# Patient Record
Sex: MALE | Race: WHITE
[De-identification: names, ages, dates, MRNs, and addresses within clinical notes are randomized per-mention and may not be internally consistent; named-entity substitution may affect disease eponyms.]

---

## 2017-10-24 ENCOUNTER — HOSPITAL ENCOUNTER (EMERGENCY)
Dept: HOSPITAL 45 - C.EDB | Age: 22
Discharge: HOME | End: 2017-10-24
Payer: COMMERCIAL

## 2017-10-24 VITALS
BODY MASS INDEX: 16.37 KG/M2 | WEIGHT: 108.03 LBS | BODY MASS INDEX: 16.37 KG/M2 | HEIGHT: 67.99 IN | HEIGHT: 67.99 IN | WEIGHT: 108.03 LBS

## 2017-10-24 VITALS
HEART RATE: 96 BPM | TEMPERATURE: 98.42 F | OXYGEN SATURATION: 97 % | SYSTOLIC BLOOD PRESSURE: 122 MMHG | DIASTOLIC BLOOD PRESSURE: 70 MMHG

## 2017-10-24 DIAGNOSIS — K29.00: Primary | ICD-10-CM

## 2017-10-24 LAB
ALP SERPL-CCNC: 72 U/L (ref 45–117)
ALT SERPL-CCNC: 18 U/L (ref 12–78)
ANION GAP SERPL CALC-SCNC: 9 MMOL/L (ref 3–11)
AST SERPL-CCNC: 18 U/L (ref 15–37)
BASOPHILS # BLD: 0.01 K/UL (ref 0–0.2)
BASOPHILS NFR BLD: 0.2 %
BUN SERPL-MCNC: 10 MG/DL (ref 7–18)
BUN/CREAT SERPL: 10 (ref 10–20)
CALCIUM SERPL-MCNC: 9.2 MG/DL (ref 8.5–10.1)
CHLORIDE SERPL-SCNC: 104 MMOL/L (ref 98–107)
CO2 SERPL-SCNC: 27 MMOL/L (ref 21–32)
COMPLETE: YES
CREAT CL PREDICTED SERPL C-G-VRATE: 84.4 ML/MIN
CREAT SERPL-MCNC: 0.96 MG/DL (ref 0.6–1.4)
EOSINOPHIL NFR BLD AUTO: 251 K/UL (ref 130–400)
GLUCOSE SERPL-MCNC: 89 MG/DL (ref 70–99)
HCT VFR BLD CALC: 43 % (ref 42–52)
IG%: 0.2 %
IMM GRANULOCYTES NFR BLD AUTO: 22.8 %
LYMPHOCYTES # BLD: 1.26 K/UL (ref 1.2–3.4)
MCH RBC QN AUTO: 31.8 PG (ref 25–34)
MCHC RBC AUTO-ENTMCNC: 34.7 G/DL (ref 32–36)
MCV RBC AUTO: 91.7 FL (ref 80–100)
MONOCYTES NFR BLD: 5.3 %
NEUTROPHILS # BLD AUTO: 2.5 %
NEUTROPHILS NFR BLD AUTO: 69 %
PMV BLD AUTO: 10.9 FL (ref 7.4–10.4)
POTASSIUM SERPL-SCNC: 3.5 MMOL/L (ref 3.5–5.1)
RBC # BLD AUTO: 4.69 M/UL (ref 4.7–6.1)
SODIUM SERPL-SCNC: 140 MMOL/L (ref 136–145)
WBC # BLD AUTO: 5.52 K/UL (ref 4.8–10.8)

## 2017-10-24 NOTE — EMERGENCY ROOM VISIT NOTE
History


Report prepared by Agata:  Ki Harden


Under the Supervision of:  Dr. Ben Pink D.O.


First contact with patient:  15:28


Chief Complaint:  ABDOMINAL PAIN


Stated Complaint:  ABD. PAINS


Nursing Triage Summary:  


mid gastric abd pain and HA with bloating for several days





History of Present Illness


The patient is a 22 year old male who presents to the Emergency Room with 

complaints of intermittent lower abdominal pain beginning two weeks ago. He 

states that he has had abdominal pain each time after has taken Excedrin for 

headaches. He also complains of abdominal bloating. The patient states that he 

has frequent headaches and often takes Excedrin. He states that this initial 

pain was "sharp", but now feels more "dull". He denies any urinary symptoms, 

cough, runny nose, or black or bloody stool.  The patient drinks alcohol, but 

has not had alcohol in about a month.  Patient denies any chest pain or 

shortness breath.  No weakness or dizziness.





   Source of History:  patient


   Onset:  Two weeks ago


   Position:  abdomen (lower)


   Timing:  intermittent


   Modifying Factors (Worsening):  other (Excedrin)


   Associated Symptoms:  + headache, No cough, No melena, No hematochezia, No 

urinary symptoms





Review of Systems


See HPI for pertinent positives & negatives. A total of 10 systems reviewed and 

were otherwise negative.





Past Medical & Surgical


Medical Problems:


(1) No Known Active Medical Problems








Family History


No pertinent family history stated.





Social History


Smoking Status:  Never Smoker


Occupation Status:  employed, [employer] student





Current/Historical Medications


Scheduled


Famotidine (Pepcid), 20 MG PO DAILY





Scheduled PRN


Aspirin-Acetaminophen-Caffeine (Excedrin Extra Strength), 1 TAB PO UD PRN for 

Pain


Calcium Carbonate (Tums), 500 MG PO UD PRN for Indigestion





Allergies


Coded Allergies:  


     No Known Allergies (Unverified , 10/24/17)





Physical Exam


Vital Signs











  Date Time  Temp Pulse Resp B/P (MAP) Pulse Ox O2 Delivery O2 Flow Rate FiO2


 


10/24/17 15:27 36.9 96 16 122/70 97   











Physical Exam


GENERAL: Sitting up in bed, alert, well appearing, well nourished, no distress, 

non-toxic 


EYE EXAM: normal conjunctiva.


OROPHARYNX: no exudate, no erythema, lips, buccal mucosa, and tongue normal and 

mucous membranes are moist


NECK: supple, no nuchal rigidity, no adenopathy, non-tender


LUNGS: Clear to auscultation. Normal chest wall mechanics


HEART: no murmurs, S1 normal and S2 normal 


ABDOMEN: abdomen soft, non-tender, normo-active bowel sounds, no masses, no 

rebound or guarding. 


BACK: Back is symmetrical on inspection and there is no deformity, no midline 

tenderness, no CVA tenderness. 


SKIN: no rashes and no bruising 


UPPER EXTREMITIES: upper extremities are grossly normal. 


LOWER EXTREMITIES: No pitting edema.


NEURO EXAM: Normal sensorium, cranial nerves II-XII grossly intact, normal 

speech,  no gross weakness of arms, no gross weakness of legs.





Medical Decision & Procedures


ER Provider


Diagnostic Interpretation:


Radiology results as stated below per my review and the radiologist's 

interpretation: 





ULTRASOUND RIGHT UPPER QUADRANT ABDOMEN





FINDINGS:





Liver: The liver is normal in size and echotexture. There is no intrahepatic


biliary ductal dilatation. The main portal vein is patent.





Gallbladder: The gallbladder is normal in appearance. No gallstones are


identified. There is no gallbladder wall thickening or pericholecystic fluid. A


sonographic Martínez's sign is reportedly absent. The common bile duct measures up


to 0.3 cm in diameter.





Pancreas: Visualized portions of the pancreatic head and body are normal in


appearance. The splenic vein is patent.





Right kidney: Survey images of the right kidney demonstrate normal size and


echotexture. There is no hydronephrosis.





Ascites: None.





IMPRESSION: Unremarkable sonographic assessment of the right upper quadrant. No


gallstones are identified.





Electronically signed by:  Shlomo Cotton M.D.


10/24/2017 4:49 PM








CHEST AND ABDOMEN 2 VIEWS





FINDINGS: The lungs are clear. The cardiomediastinal silhouette is within normal


limits.


There is no pneumoperitoneum or pneumatosis. The bowel gas pattern is


unremarkable. No evidence for bowel obstruction. No renal or ureteral calculi.


Punctate calcification within the left deep pelvis likely represents a


phlebolith. Incidental note is made of a right azygos lobe..





IMPRESSION:  


No acute cardiopulmonary process. No evidence for bowel obstruction. 





Electronically signed by:  Eddie Quinonez M.D.


10/24/2017 4:25 PM





Laboratory Results


10/24/17 15:55








Red Blood Count 4.69, Mean Corpuscular Volume 91.7, Mean Corpuscular Hemoglobin 

31.8, Mean Corpuscular Hemoglobin Concent 34.7, Mean Platelet Volume 10.9, 

Neutrophils (%) (Auto) 69.0, Lymphocytes (%) (Auto) 22.8, Monocytes (%) (Auto) 

5.3, Eosinophils (%) (Auto) 2.5, Basophils (%) (Auto) 0.2, Neutrophils # (Auto) 

3.81, Lymphocytes # (Auto) 1.26, Monocytes # (Auto) 0.29, Eosinophils # (Auto) 

0.14, Basophils # (Auto) 0.01





10/24/17 15:55

















Test


  10/24/17


15:55


 


White Blood Count


  5.52 K/uL


(4.8-10.8)


 


Red Blood Count


  4.69 M/uL


(4.7-6.1)


 


Hemoglobin


  14.9 g/dL


(14.0-18.0)


 


Hematocrit 43.0 % (42-52) 


 


Mean Corpuscular Volume


  91.7 fL


()


 


Mean Corpuscular Hemoglobin


  31.8 pg


(25-34)


 


Mean Corpuscular Hemoglobin


Concent 34.7 g/dl


(32-36)


 


Platelet Count


  251 K/uL


(130-400)


 


Mean Platelet Volume


  10.9 fL


(7.4-10.4)


 


Neutrophils (%) (Auto) 69.0 % 


 


Lymphocytes (%) (Auto) 22.8 % 


 


Monocytes (%) (Auto) 5.3 % 


 


Eosinophils (%) (Auto) 2.5 % 


 


Basophils (%) (Auto) 0.2 % 


 


Neutrophils # (Auto)


  3.81 K/uL


(1.4-6.5)


 


Lymphocytes # (Auto)


  1.26 K/uL


(1.2-3.4)


 


Monocytes # (Auto)


  0.29 K/uL


(0.11-0.59)


 


Eosinophils # (Auto)


  0.14 K/uL


(0-0.5)


 


Basophils # (Auto)


  0.01 K/uL


(0-0.2)


 


RDW Standard Deviation


  42.3 fL


(36.4-46.3)


 


RDW Coefficient of Variation


  12.7 %


(11.5-14.5)


 


Immature Granulocyte % (Auto) 0.2 % 


 


Immature Granulocyte # (Auto)


  0.01 K/uL


(0.00-0.02)


 


Anion Gap


  9.0 mmol/L


(3-11)


 


Est Creatinine Clear Calc


Drug Dose 84.4 ml/min 


 


 


Estimated GFR (


American) 130.4 


 


 


Estimated GFR (Non-


American 112.5 


 


 


BUN/Creatinine Ratio 10.0 (10-20) 


 


Calcium Level


  9.2 mg/dl


(8.5-10.1)


 


Total Bilirubin


  0.6 mg/dl


(0.2-1)


 


Direct Bilirubin


  0.2 mg/dl


(0-0.2)


 


Aspartate Amino Transf


(AST/SGOT) 18 U/L (15-37) 


 


 


Alanine Aminotransferase


(ALT/SGPT) 18 U/L (12-78) 


 


 


Alkaline Phosphatase


  72 U/L


()


 


Total Protein


  8.3 gm/dl


(6.4-8.2)


 


Albumin


  4.7 gm/dl


(3.4-5.0)


 


Lipase


  86 U/L


()





Laboratory results per my review.





Medications Administered











 Medications


  (Trade)  Dose


 Ordered  Sig/Gregorio


 Route  Start Time


 Stop Time Status Last Admin


Dose Admin


 


 Miscellaneous


 Medication


  (Gi Cocktail)  24 ml  NOW  STAT


 PO  10/24/17 15:41


 10/24/17 15:51 DC 10/24/17 15:57


24 ML











ED Course


ED COURSE: 


Vital signs were reviewed and appeared normal. 


The patients medical record was reviewed


The above diagnostic studies were performed and reviewed.


ED treatments and interventions as stated above. 





1530: The patient was evaluated in room B6. A complete history and physical 

examination was performed.





1541: Ordered GI Cocktail 24 mL PO. 





1655: Upon reevaluation, the patient is resting comfortably. I discussed my 

findings with the patient and he understands and agrees with the treatment 

plan.   


Based on the patients age, coexisting illnesses, exam and lab findings the 

decision to treat as an outpatient was made.


The patient remained stable while under my care.


The patient appeared well at the time of discharge.





Medical Decision


Differential diagnoses includes but is not limited to gastritis, peptic ulcer 

disease, GERD, gallbladder disease, pancreatitis, small bowel obstruction, 

acute coronary syndrome, pericarditis, ischemic bowel, irritable bowel disease, 

irritable bowel syndrome, appendicitis, diverticulitis, malignancy, hernia, 

urinary tract infection, torsion, perforation, trauma, infectious. 





Patient is a 22-year-old male who presents to ER for abdominal pain following 

taking Motrin or Excedrin.  He notes that this has been intermittently present 

for the past 2 weeks.  No dark tarry stools.  No blood in stool.  CBC all BMP, 

LFTs, bilirubin lipase was unremarkable.  Patient was given a GI cocktail.  

Obstruction series and ultrasound the right upper quadrant were unremarkable.  

He felt significant better following the GI cocktail.  He was updated at 

bedside.  He was discharged as I favor he likely has gastritis.  Showed him to 

refrain from NSAIDs, caffeine and alcohol.  Will monitor for any worsening pain

, blood in stool or dark tarry stools.  Follow-up with PCP. Discussed with Pt 

concerning signs and symptoms to watch out for. Pt was instructed to follow up 

with their PCP and discussed with the patient their option to return to the ED 

at anytime for persistent or worsening symptoms. The appropriate anticipatory 

guidance and out-patient management, including indications for return to the 

emergency department, were explained at length to the patient and understood.





Medication Reconcilliation


Current Medication List:  was personally reviewed by me





Blood Pressure Screening


Patient's blood pressure:  Normal blood pressure


Blood pressure disposition:  Did not require urgent referral





Impression





 Primary Impression:  


 Gastritis





Scribe Attestation


The scribe's documentation has been prepared under my direction and personally 

reviewed by me in its entirety. I confirm that the note above accurately 

reflects all work, treatment, procedures, and medical decision making performed 

by me.





Departure Information


Dispostion


Home / Self-Care





Prescriptions





Famotidine (Pepcid) 20 Mg Tab


20 MG PO DAILY, #30 TAB


   Prov: Ben Pink, DO         10/24/17





Referrals


No Doctor, Assigned (PCP)





Forms


HOME CARE DOCUMENTATION FORM,                                                 

               IMPORTANT VISIT INFORMATION





Patient Instructions


ED PUD Vs Gastritis, My [facility]





Additional Instructions





Please follow up with your primary care doctor or if you are a student, 

[facility] with in the next 24 hours.  Any worsening of your 

symptoms, please return to the ED immediately. This includes any fevers greater 

than 100.4, worsening pain, chest pain, shortness breath, persistent nausea, 

vomiting, unable to eat or drink, or any other concerning signs or symptoms 

from your standpoint.





Please refrain from taking any NSAIDs which Includes Motrin or Ibuprofen.





Please take Pepcid as prescribed.





Problem Qualifiers








 Primary Impression:  


 Gastritis


 Gastritis type:  unspecified gastritis  Chronicity:  acute  Gastritis bleeding

:  without bleeding  Qualified Codes:  K29.00 - Acute gastritis without bleeding

## 2017-10-24 NOTE — DIAGNOSTIC IMAGING REPORT
ULTRASOUND RIGHT UPPER QUADRANT ABDOMEN



CLINICAL HISTORY: Right upper quadrant abdominal pain.



COMPARISON STUDY: Abdominal radiograph dated 10/24/2017.



TECHNIQUE: Real-time, grayscale, and color flow sonography of the right upper

quadrant of the abdomen was performed. Images are reviewed in the transverse and

longitudinal planes.



FINDINGS:



Liver: The liver is normal in size and echotexture. There is no intrahepatic

biliary ductal dilatation. The main portal vein is patent.



Gallbladder: The gallbladder is normal in appearance. No gallstones are

identified. There is no gallbladder wall thickening or pericholecystic fluid. A

sonographic Martínez's sign is reportedly absent. The common bile duct measures up

to 0.3 cm in diameter.



Pancreas: Visualized portions of the pancreatic head and body are normal in

appearance. The splenic vein is patent.



Right kidney: Survey images of the right kidney demonstrate normal size and

echotexture. There is no hydronephrosis.



Ascites: None.



IMPRESSION: Unremarkable sonographic assessment of the right upper quadrant. No

gallstones are identified.







Electronically signed by:  Shlomo Cotton M.D.

10/24/2017 4:49 PM



Dictated Date/Time:  10/24/2017 4:48 PM

## 2017-10-24 NOTE — DIAGNOSTIC IMAGING REPORT
CHEST AND ABDOMEN 2 VIEWS



HISTORY: Generalized abdominal pain.   



COMPARISON:  None.



FINDINGS: The lungs are clear. The cardiomediastinal silhouette is within normal

limits.

There is no pneumoperitoneum or pneumatosis. The bowel gas pattern is

unremarkable. No evidence for bowel obstruction. No renal or ureteral calculi.

Punctate calcification within the left deep pelvis likely represents a

phlebolith. Incidental note is made of a right azygos lobe..



IMPRESSION:  

No acute cardiopulmonary process. No evidence for bowel obstruction. 









Electronically signed by:  Eddie Quinonez M.D.

10/24/2017 4:25 PM



Dictated Date/Time:  10/24/2017 4:23 PM

## 2018-04-23 ENCOUNTER — HOSPITAL ENCOUNTER (EMERGENCY)
Dept: HOSPITAL 45 - C.EDB | Age: 23
Discharge: HOME | End: 2018-04-23
Payer: COMMERCIAL

## 2018-04-23 VITALS
HEIGHT: 67.01 IN | BODY MASS INDEX: 16.78 KG/M2 | WEIGHT: 106.92 LBS | BODY MASS INDEX: 16.78 KG/M2 | WEIGHT: 106.92 LBS | HEIGHT: 67.01 IN

## 2018-04-23 VITALS — DIASTOLIC BLOOD PRESSURE: 61 MMHG | OXYGEN SATURATION: 98 % | SYSTOLIC BLOOD PRESSURE: 103 MMHG | HEART RATE: 75 BPM

## 2018-04-23 VITALS — TEMPERATURE: 97.7 F

## 2018-04-23 DIAGNOSIS — R10.10: Primary | ICD-10-CM

## 2018-04-23 LAB
ALBUMIN SERPL-MCNC: 4.4 GM/DL (ref 3.4–5)
ALP SERPL-CCNC: 67 U/L (ref 45–117)
ALT SERPL-CCNC: 18 U/L (ref 12–78)
AST SERPL-CCNC: 16 U/L (ref 15–37)
BASOPHILS # BLD: 0.01 K/UL (ref 0–0.2)
BASOPHILS NFR BLD: 0.2 %
BUN SERPL-MCNC: 10 MG/DL (ref 7–18)
CALCIUM SERPL-MCNC: 9.2 MG/DL (ref 8.5–10.1)
CO2 SERPL-SCNC: 28 MMOL/L (ref 21–32)
CREAT SERPL-MCNC: 0.92 MG/DL (ref 0.6–1.4)
EOS ABS #: 0.51 K/UL (ref 0–0.5)
EOSINOPHIL NFR BLD AUTO: 222 K/UL (ref 130–400)
GLUCOSE SERPL-MCNC: 92 MG/DL (ref 70–99)
HCT VFR BLD CALC: 42.5 % (ref 42–52)
HGB BLD-MCNC: 15 G/DL (ref 14–18)
IG#: 0 K/UL (ref 0–0.02)
IMM GRANULOCYTES NFR BLD AUTO: 22.5 %
LIPASE: 88 U/L (ref 73–393)
LYMPHOCYTES # BLD: 1.01 K/UL (ref 1.2–3.4)
MCH RBC QN AUTO: 32.4 PG (ref 25–34)
MCHC RBC AUTO-ENTMCNC: 35.3 G/DL (ref 32–36)
MCV RBC AUTO: 91.8 FL (ref 80–100)
MONO ABS #: 0.22 K/UL (ref 0.11–0.59)
MONOCYTES NFR BLD: 4.9 %
NEUT ABS #: 2.73 K/UL (ref 1.4–6.5)
NEUTROPHILS # BLD AUTO: 11.4 %
NEUTROPHILS NFR BLD AUTO: 61 %
PMV BLD AUTO: 10.6 FL (ref 7.4–10.4)
POTASSIUM SERPL-SCNC: 3.7 MMOL/L (ref 3.5–5.1)
PROT SERPL-MCNC: 7.8 GM/DL (ref 6.4–8.2)
RED CELL DISTRIBUTION WIDTH CV: 12.5 % (ref 11.5–14.5)
RED CELL DISTRIBUTION WIDTH SD: 42.2 FL (ref 36.4–46.3)
SODIUM SERPL-SCNC: 137 MMOL/L (ref 136–145)
WBC # BLD AUTO: 4.48 K/UL (ref 4.8–10.8)

## 2018-04-23 NOTE — EMERGENCY ROOM VISIT NOTE
History


Report prepared by Agata:  Linus Pantoja


Under the Supervision of:  Dr. Skinny Coates M.D.


First contact with patient:  14:56


Chief Complaint:  ABDOMINAL PAIN


Stated Complaint:  ABDOMINAL DISCOMFORT





History of Present Illness


The patient is a 22 year old male who presents to the Emergency Room with 

complaints of on and off abdominal pain since October, and he states that he 

gets the pain every other day usually. He currently rates his discomfort as a 1-

2/10 in severity and states that it feels like a burning with an occasional 

pinch. The patient states that the pain is "behind his belly button on the left 

to behind his liver". He reports that it initially started after mixing 

Excedrin and ibuprofen, and he was diagnosed with gastritis. The patient was 

prescribed Pepcid afterwards which helped, though he ran out of medications, 

and the pain came back. He notes that the pain is worsened after drinking 

alcohol, fatty foods, and spicy foods. He states that he has not drank alcohol 

in the past 2 months. The patient states that he had diarrhea yesterday morning 

after drinking milk, and today he had a normal bowel movement.  He is not sure 

if he is lactose intolerant but has been noticing some crampy pain and diarrhea 

after drinking milk.  He denies any family history of Crohn's disease or IBS. 

The patient denies any fever, black stools, bloody stools, difficulty urinating

, hematuria, and vomiting. He has no other chronic medical problems.





   Source of History:  patient


   Onset:  October


   Position:  abdomen (behind his belly button on the left to behind his liver)


   Quality:  burning, other (pinching)


   Timing:  other (on and off)


   Modifying Factors (Worsening):  other (drinking alcohol, eating spicy food, 

and eating greasy food)


   Associated Symptoms:  + diarrhea, No fevers, No vomiting, No urinary symptoms





Review of Systems


See HPI for pertinent positives & negatives. A total of 10 systems reviewed and 

were otherwise negative.





Past Medical & Surgical


Medical Problems:


(1) No Known Active Medical Problems








Social History


Smoking Status:  Never Smoker


Marital Status:  single


Occupation Status:  employed, [employer] student





Current/Historical Medications


Scheduled


Pantoprazole (Protonix), 20 MG PO DAILY





Scheduled PRN


Acetaminophen Tab (Tylenol), 650 MG PO Q6 PRN for Headache or Pain





Allergies


Coded Allergies:  


     No Known Allergies (Unverified , 10/24/17)





Physical Exam


Vital Signs











  Date Time  Temp Pulse Resp B/P (MAP) Pulse Ox O2 Delivery O2 Flow Rate FiO2


 


4/23/18 17:29  75 16 103/61 98   


 


4/23/18 16:03  63 16 95/53 98 Room Air  


 


4/23/18 14:53 36.5 79 20 101/62 96 Room Air  











Physical Exam





Constitutional: Vital signs reviewed.


Eyes: Pupils are equal round reactive to light.  Conjunctiva are noninjected.  


ENT: Pharynx is clear without erythema or exudate.  Mucous membranes are moist.

  Neck supple without meningeal signs.


Respiratory: Clear to auscultation bilaterally.  Breath sounds are equal 

bilaterally. 


Cardiovascular: Regular rate and rhythm.  No rubs or gallops.


GI: Soft, nondistended and nontender.  Bowel sounds are present.


Musculoskeletal: No peripheral edema.  No lower extremity tenderness. 


Integumentary: No cyanosis.


Neurological: The patient is awake and alert.  No focal deficits.


Psychiatric: Normal affect.





Medical Decision & Procedures


Laboratory Results


4/23/18 15:17








Red Blood Count 4.63, Mean Corpuscular Volume 91.8, Mean Corpuscular Hemoglobin 

32.4, Mean Corpuscular Hemoglobin Concent 35.3, Mean Platelet Volume 10.6, 

Neutrophils (%) (Auto) 61.0, Lymphocytes (%) (Auto) 22.5, Monocytes (%) (Auto) 

4.9, Eosinophils (%) (Auto) 11.4, Basophils (%) (Auto) 0.2, Neutrophils # (Auto

) 2.73, Lymphocytes # (Auto) 1.01, Monocytes # (Auto) 0.22, Eosinophils # (Auto

) 0.51, Basophils # (Auto) 0.01





4/23/18 15:17

















Test


  4/23/18


15:17


 


White Blood Count


  4.48 K/uL


(4.8-10.8)


 


Red Blood Count


  4.63 M/uL


(4.7-6.1)


 


Hemoglobin


  15.0 g/dL


(14.0-18.0)


 


Hematocrit 42.5 % (42-52) 


 


Mean Corpuscular Volume


  91.8 fL


()


 


Mean Corpuscular Hemoglobin


  32.4 pg


(25-34)


 


Mean Corpuscular Hemoglobin


Concent 35.3 g/dl


(32-36)


 


Platelet Count


  222 K/uL


(130-400)


 


Mean Platelet Volume


  10.6 fL


(7.4-10.4)


 


Neutrophils (%) (Auto) 61.0 % 


 


Lymphocytes (%) (Auto) 22.5 % 


 


Monocytes (%) (Auto) 4.9 % 


 


Eosinophils (%) (Auto) 11.4 % 


 


Basophils (%) (Auto) 0.2 % 


 


Neutrophils # (Auto)


  2.73 K/uL


(1.4-6.5)


 


Lymphocytes # (Auto)


  1.01 K/uL


(1.2-3.4)


 


Monocytes # (Auto)


  0.22 K/uL


(0.11-0.59)


 


Eosinophils # (Auto)


  0.51 K/uL


(0-0.5)


 


Basophils # (Auto)


  0.01 K/uL


(0-0.2)


 


RDW Standard Deviation


  42.2 fL


(36.4-46.3)


 


RDW Coefficient of Variation


  12.5 %


(11.5-14.5)


 


Immature Granulocyte % (Auto) 0.0 % 


 


Immature Granulocyte # (Auto)


  0.00 K/uL


(0.00-0.02)


 


Anion Gap


  5.0 mmol/L


(3-11)


 


Est Creatinine Clear Calc


Drug Dose 87.1 ml/min 


 


 


Estimated GFR (


American) 137.3 


 


 


Estimated GFR (Non-


American 118.5 


 


 


BUN/Creatinine Ratio 10.4 (10-20) 


 


Calcium Level


  9.2 mg/dl


(8.5-10.1)


 


Total Bilirubin


  0.6 mg/dl


(0.2-1)


 


Direct Bilirubin


  0.1 mg/dl


(0-0.2)


 


Aspartate Amino Transf


(AST/SGOT) 16 U/L (15-37) 


 


 


Alanine Aminotransferase


(ALT/SGPT) 18 U/L (12-78) 


 


 


Alkaline Phosphatase


  67 U/L


()


 


Total Protein


  7.8 gm/dl


(6.4-8.2)


 


Albumin


  4.4 gm/dl


(3.4-5.0)


 


Lipase


  88 U/L


()








Laboratory results as reviewed by me.





Medications Administered











 Medications


  (Trade)  Dose


 Ordered  Sig/Gregorio


 Route  Start Time


 Stop Time Status Last Admin


Dose Admin


 


 Lidocaine HCl


  (Viscous


 Lidocaine 2% Soln)  10 ml  NOW  STAT


 PO  4/23/18 15:06


 4/23/18 15:08 DC 4/23/18 15:19


10 ML


 


 Al Hydroxide/Mg


 Hydroxide


  (Maalox Susp)  30 ml  NOW  STAT


 PO  4/23/18 15:06


 4/23/18 15:08 DC 4/23/18 15:19


30 ML











ED Course


1456: The patient was evaluated in room C7. A complete history and physical 

exam was performed.





1506: Maalox Susp 30ml PO, Viscous Lidocaine 2% Soln 10ml PO





1646: I reevaluated the patient, and he was feeling a little better. He will be 

discharged home.





Medical Decision


This is a 22-year-old male who presents with abdominal pain.  Differential 

diagnosis includes peptic ulcer disease, gastritis, pancreatitis, gallbladder 

disease, irritable bowel syndrome, inflammatory bowel disease.  I did perform a 

limited focused review of portions of the patient's old chart on the electronic 

medical record. The patient was seen here in October 2017 for abdominal pain 

and had an ultrasound of the right upper quadrant which was unremarkable. He 

was treated with a GI cocktail and discharged on Pepcid.





I did evaluate the patient as noted above.  Patient is presenting with upper 

abdominal pain.  He was diagnosed with gastritis and improved with Pepcid.  He 

had a negative right upper quadrant ultrasound in October.  He is presenting 

with the same pain which seems to be worse after alcohol and spicy foods.  IV 

access was established.  I did treat him with a GI cocktail.  I did order and 

review the patient's blood work as noted in the electronic medical record.  His 

labs are fairly unremarkable.  I did reassess the patient.  He states he is 

feeling better.  I did discuss the test results with him.  I did recommend he 

follow-up with his regular physician for further evaluation and possible 

referral to GI should his symptoms persist.  He was advised to avoid dairy and 

foods that have traditionally triggered his pain.  He was also given a 

prescription for Protonix.  He was discharged in good condition and given 

return instructions as outlined below.





Medication Reconcilliation


Current Medication List:  was personally reviewed by me





Blood Pressure Screening


Patient's blood pressure:  Normal blood pressure





Impression





 Primary Impression:  


 Upper abdominal pain





Scribe Attestation


The scribe's documentation has been prepared under my direct and personally 

reviewed by me in its entirety. I confirm that the note above accurately 

reflects all work, treatment, procedures, and medical decision making performed 

by me.





Departure Information


Dispostion


Home / Self-Care





Prescriptions





Pantoprazole (Protonix) 20 Mg Tab


20 MG PO DAILY, #20 TAB


   Prov: Skinny Coates M.D.         4/23/18





Referrals


No Doctor, Assigned (PCP)





Forms


HOME CARE DOCUMENTATION FORM,                                                 

               IMPORTANT VISIT INFORMATION





Patient Instructions


ED Abdominal Pain Unkn Cause Male, My [facility]





Additional Instructions





You have been examined and treated today on an emergency basis only. This is 

not a substitute for, or an effort to provide, complete comprehensive medical 

care. It is impossible to recognize and treat all injuries or illnesses in a 

single emergency department visit. It is therefore important that you follow up 

closely with your physician.  Call as soon as possible for an appointment.  

Return for worsening symptoms or if you develop fever, vomiting, black or tarry 

stools or any other concerning symptoms.